# Patient Record
Sex: FEMALE | Race: WHITE | Employment: OTHER | URBAN - METROPOLITAN AREA
[De-identification: names, ages, dates, MRNs, and addresses within clinical notes are randomized per-mention and may not be internally consistent; named-entity substitution may affect disease eponyms.]

---

## 2018-05-10 ENCOUNTER — MEDICAL CORRESPONDENCE (OUTPATIENT)
Dept: HEALTH INFORMATION MANAGEMENT | Facility: CLINIC | Age: 68
End: 2018-05-10

## 2018-05-10 ENCOUNTER — TRANSFERRED RECORDS (OUTPATIENT)
Dept: HEALTH INFORMATION MANAGEMENT | Facility: CLINIC | Age: 68
End: 2018-05-10

## 2018-06-08 ENCOUNTER — APPOINTMENT (OUTPATIENT)
Dept: INTERVENTIONAL RADIOLOGY/VASCULAR | Facility: CLINIC | Age: 68
End: 2018-06-08
Attending: INTERNAL MEDICINE
Payer: MEDICARE

## 2018-06-08 ENCOUNTER — HOSPITAL ENCOUNTER (OUTPATIENT)
Facility: CLINIC | Age: 68
Discharge: HOME | End: 2018-06-08
Attending: RADIOLOGY | Admitting: RADIOLOGY
Payer: MEDICARE

## 2018-06-08 VITALS
DIASTOLIC BLOOD PRESSURE: 89 MMHG | HEIGHT: 66 IN | BODY MASS INDEX: 32.14 KG/M2 | TEMPERATURE: 96.7 F | RESPIRATION RATE: 16 BRPM | WEIGHT: 200 LBS | OXYGEN SATURATION: 98 % | SYSTOLIC BLOOD PRESSURE: 163 MMHG

## 2018-06-08 DIAGNOSIS — I82.401 DEEP VEIN THROMBOSIS (DVT) OF RIGHT LOWER EXTREMITY, UNSPECIFIED CHRONICITY, UNSPECIFIED VEIN (H): ICD-10-CM

## 2018-06-08 PROCEDURE — C1769 GUIDE WIRE: HCPCS

## 2018-06-08 PROCEDURE — 99152 MOD SED SAME PHYS/QHP 5/>YRS: CPT

## 2018-06-08 PROCEDURE — 25000128 H RX IP 250 OP 636: Performed by: RADIOLOGY

## 2018-06-08 PROCEDURE — 27210742 ZZH CATH CR1

## 2018-06-08 PROCEDURE — 37193 REM ENDOVAS VENA CAVA FILTER: CPT

## 2018-06-08 PROCEDURE — 76937 US GUIDE VASCULAR ACCESS: CPT

## 2018-06-08 PROCEDURE — 27210908 ZZH NEEDLE CR4

## 2018-06-08 PROCEDURE — 25000125 ZZHC RX 250

## 2018-06-08 PROCEDURE — 27210906 ZZH KIT CR8

## 2018-06-08 PROCEDURE — C1773 RET DEV, INSERTABLE: HCPCS

## 2018-06-08 RX ORDER — FENTANYL CITRATE 50 UG/ML
50 INJECTION, SOLUTION INTRAMUSCULAR; INTRAVENOUS
Status: DISCONTINUED | OUTPATIENT
Start: 2018-06-08 | End: 2018-06-08 | Stop reason: HOSPADM

## 2018-06-08 RX ORDER — NALOXONE HYDROCHLORIDE 0.4 MG/ML
.1-.4 INJECTION, SOLUTION INTRAMUSCULAR; INTRAVENOUS; SUBCUTANEOUS
Status: DISCONTINUED | OUTPATIENT
Start: 2018-06-08 | End: 2018-06-08 | Stop reason: HOSPADM

## 2018-06-08 RX ORDER — FENTANYL CITRATE 50 UG/ML
25 INJECTION, SOLUTION INTRAMUSCULAR; INTRAVENOUS EVERY 5 MIN PRN
Status: DISCONTINUED | OUTPATIENT
Start: 2018-06-08 | End: 2018-06-08 | Stop reason: HOSPADM

## 2018-06-08 RX ORDER — ACETAMINOPHEN 500 MG
500 TABLET ORAL EVERY 6 HOURS PRN
Status: CANCELLED | OUTPATIENT
Start: 2018-06-08

## 2018-06-08 RX ORDER — FLUMAZENIL 0.1 MG/ML
0.2 INJECTION, SOLUTION INTRAVENOUS
Status: DISCONTINUED | OUTPATIENT
Start: 2018-06-08 | End: 2018-06-08 | Stop reason: HOSPADM

## 2018-06-08 RX ORDER — NICOTINE POLACRILEX 4 MG
15-30 LOZENGE BUCCAL
Status: CANCELLED | OUTPATIENT
Start: 2018-06-08

## 2018-06-08 RX ORDER — DEXTROSE MONOHYDRATE 25 G/50ML
25-50 INJECTION, SOLUTION INTRAVENOUS
Status: CANCELLED | OUTPATIENT
Start: 2018-06-08

## 2018-06-08 RX ORDER — FENTANYL CITRATE 50 UG/ML
INJECTION, SOLUTION INTRAMUSCULAR; INTRAVENOUS
Status: DISCONTINUED
Start: 2018-06-08 | End: 2018-06-08 | Stop reason: HOSPADM

## 2018-06-08 RX ORDER — LIDOCAINE HYDROCHLORIDE 10 MG/ML
INJECTION, SOLUTION EPIDURAL; INFILTRATION; INTRACAUDAL; PERINEURAL
Status: COMPLETED
Start: 2018-06-08 | End: 2018-06-08

## 2018-06-08 RX ADMIN — HEPARIN SODIUM 10000 UNITS: 10000 INJECTION, SOLUTION INTRAVENOUS; SUBCUTANEOUS at 08:41

## 2018-06-08 RX ADMIN — MIDAZOLAM 1 MG: 1 INJECTION INTRAMUSCULAR; INTRAVENOUS at 08:42

## 2018-06-08 RX ADMIN — FENTANYL CITRATE 50 MCG: 50 INJECTION INTRAMUSCULAR; INTRAVENOUS at 08:33

## 2018-06-08 RX ADMIN — MIDAZOLAM 1 MG: 1 INJECTION INTRAMUSCULAR; INTRAVENOUS at 08:34

## 2018-06-08 RX ADMIN — LIDOCAINE HYDROCHLORIDE 6 ML: 10 INJECTION, SOLUTION EPIDURAL; INFILTRATION; INTRACAUDAL; PERINEURAL at 08:40

## 2018-06-08 NOTE — DISCHARGE INSTRUCTIONS
Invasive Radiology Procedures Discharge Instructions      The doctor who did your procedure today was Dr. Nickerson     Diet and medicines      Go back to your normal diet.    You may start taking your normal medicines again (including Coumadin, or warfarin),         as shown on your medicine sheet.     If you take aspirin, Plavix or other anti-platelet drugs: Start taking it tomorrow.     If take Coumadin (warfarin): Ask your doctor when to have your INR checked        For minor pain, you may take Tylenol (acetaminophen) or Advil (ibuprofen).    Activity and puncture site    You may go back to normal activity in 24 hours. Wait 48 hours before lifting,straining, exercise or other strenuous activity.    For the next day or two, check your puncture site often while you are awake.    Change the Band-Aid or bandage tomorrow.    You may shower tomorrow morning. No bathing or swimming until yourpuncture site has fully healed.     If you received IV medicine to sedate you: Versed and fentanyl  You may feel drowsy, forgetful or unsteady. For the next 24 hours, do not drive,  drink alcohol or make any important decisions.    ??Know when to call for help  Call your doctor if you have:  -?A fever greater over 101 F (38.3 C), taken under the tongue.  -?A lot of bleeding or swelling at your puncture site.  -?Pain that is getting worse.  -?Shortness of breath.  Call 911 or go the emergency room if you have:  -?Severe chest pain or trouble breathing.  -?A tube that falls out.  -?Increased blood in your sputum (phlegm).  -?Bleeding that you cannot control.  Important phone numbers:  ??[facility] ..................................................................... [phone]

## 2018-06-08 NOTE — IP AVS SNAPSHOT
MRN:[id number]                      After Visit Summary   6/8/2018    Jannie Tejada    MRN: [id number]           Visit Information        Department      6/8/2018  7:33 AM [facility]          Review of your medicines      UNREVIEWED medicines. Ask your doctor about these medicines        Dose / Directions    fluorescein-benoxinate ophthalmic solution   Commonly known as:  FLURATE        Dose:  1 drop   1 drop once   Refills:  0       hydrochlorothiazide 12.5 MG capsule   Commonly known as:  MICROZIDE        Take by mouth daily   Refills:  0       LIPITOR PO        Dose:  10 mg   Take 10 mg by mouth daily   Refills:  0       LISINOPRIL PO        Dose:  10 mg   Take 10 mg by mouth daily   Refills:  0       magic mouthwash suspension (diphenhydrAMINE, lidocaine, aluminum-magnesium & simethicone) Susp compounding kit        Dose:  15 mL   Swish and swallow 15 mLs in mouth every 6 hours as needed (heartburn, upper abdominal pain)   Quantity:  237 mL   Refills:  0       metoclopramide 10 MG tablet   Commonly known as:  REGLAN        Dose:  10 mg   Take 1 tablet (10 mg) by mouth 4 times daily as needed (nausea or before bed)   Quantity:  20 tablet   Refills:  0       PROZAC PO        Dose:  10 mg   Take 10 mg by mouth   Refills:  0                Protect others around you: Learn how to safely use, store and throw away your medicines at www.disposemymeds.org.         Follow-ups after your visit        Your next 10 appointments already scheduled     Jul 12, 2018   Procedure with Kimberlee Dial MD   [facility] ([facility])    [address] [place] [address]   [phone]           [facility] is located at [address] [place]               Care Instructions        Further instructions from your care team         Invasive Radiology Procedures Discharge Instructions      The doctor who did your procedure today was   "Krishan     Diet and medicines      Go back to your normal diet.    You may start taking your normal medicines again (including Coumadin, or warfarin),         as shown on your medicine sheet.     If you take aspirin, Plavix or other anti-platelet drugs: Start taking it tomorrow.     If take Coumadin (warfarin): Ask your doctor when to have your INR checked        For minor pain, you may take Tylenol (acetaminophen) or Advil (ibuprofen).    Activity and puncture site    You may go back to normal activity in 24 hours. Wait 48 hours before lifting,straining, exercise or other strenuous activity.    For the next day or two, check your puncture site often while you are awake.    Change the Band-Aid or bandage tomorrow.    You may shower tomorrow morning. No bathing or swimming until yourpuncture site has fully healed.     If you received IV medicine to sedate you: Versed and fentanyl  You may feel drowsy, forgetful or unsteady. For the next 24 hours, do not drive,  drink alcohol or make any important decisions.    ??Know when to call for help  Call your doctor if you have:  -?A fever greater over 101 F (38.3 C), taken under the tongue.  -?A lot of bleeding or swelling at your puncture site.  -?Pain that is getting worse.  -?Shortness of breath.  Call 911 or go the emergency room if you have:  -?Severe chest pain or trouble breathing.  -?A tube that falls out.  -?Increased blood in your sputum (phlegm).  -?Bleeding that you cannot control.  Important phone numbers:  ??[facility] ..................................................................... [phone]       Additional Information About Your Visit        [facility] Information     [facility] lets you send messages to your doctor, view your test results, renew your prescriptions, schedule appointments and more. To sign up, go to [email or web address] . Click on \"Log in\" on the left side of the screen, which will take you to the Welcome page. Then click " "on \"Sign up Now\" on the right side of the page.     You will be asked to enter the access code listed below, as well as some personal information. Please follow the directions to create your username and password.     Your access code is: [email or web address]  Expires: 2018  9:27 AM     Your access code will  in 90 days. If you need help or a new code, please call your [place] clinic or [phone].        Care EveryWhere ID     This is your Care EveryWhere ID. This could be used by other organizations to access your [place] medical records  [id number]        Your Vitals Were     Blood Pressure Temperature Respirations Height Weight Pulse Oximetry    164/92 (BP Location: Left arm) 96.7  F (35.9  C) (Temporal) 16 1.676 m (5' 6\") 90.7 kg (200 lb) 95%    BMI (Body Mass Index)                   32.28 kg/m2            Primary Care Provider Office Phone # Fax #    Yoon Mcgee [phone]      Equal Access to Services     [facility]: Hadii saurabh ku hadasho Sodickson, waaxda luqadaha, qaybta kaalmada adeegyajoaquin, gurinder amanda . So [facility] [phone].    ATENCIÓN: Si habla español, tiene a bunn disposición servicios gratuitos de asistencia lingüística. Llame al [phone].    We comply with applicable federal civil rights laws and [place] laws. We do not discriminate on the basis of race, color, national origin, age, disability, sex, sexual orientation, or gender identity.            Thank you!     Thank you for choosing [facility] for your care. Our goal is always to provide you with excellent care. Hearing back from our patients is one way we can continue to improve our services. Please take a few minutes to complete the written survey that you may receive in the mail after you visit. If you would like to speak to someone directly about your visit please contact Patient Relations at [phone]. Thank you!               Medication List: This is a list of " all your medications and when to take them. Check marks below indicate your daily home schedule. Keep this list as a reference.      Medications           Morning Afternoon Evening Bedtime As Needed    fluorescein-benoxinate ophthalmic solution   Commonly known as:  FLURATE   1 drop once                                hydrochlorothiazide 12.5 MG capsule   Commonly known as:  MICROZIDE   Take by mouth daily                                LIPITOR PO   Take 10 mg by mouth daily                                LISINOPRIL PO   Take 10 mg by mouth daily                                magic mouthwash suspension (diphenhydrAMINE, lidocaine, aluminum-magnesium & simethicone) Susp compounding kit   Swish and swallow 15 mLs in mouth every 6 hours as needed (heartburn, upper abdominal pain)                                metoclopramide 10 MG tablet   Commonly known as:  REGLAN   Take 1 tablet (10 mg) by mouth 4 times daily as needed (nausea or before bed)                                PROZAC PO   Take 10 mg by mouth

## 2018-06-08 NOTE — PROCEDURES
RADIOLOGY POST PROCEDURE NOTE w/ SEDATION  Patient name: Jannie Tejada  MRN: [id number]  : 1950    Pre-procedure diagnosis: IVC filter no longer needed.   Post-procedure diagnosis: Same    Procedure Date/Time: 2018  9:04 AM  Procedure: IVC filter removal.   Estimated blood loss: None  Specimen(s) collected with description: none    I determined this patient to be an appropriate candidate for the planned sedation and procedure and reassessed the patient IMMEDIATELY PRIOR to sedation and procedure.     The patient tolerated the procedure well with no immediate complications.  Significant findings:none    See imaging dictation for procedural details.    Provider name: Lara Nickerson  Assistant(s):None

## 2018-06-08 NOTE — IP AVS SNAPSHOT
[facility]    [address]    [facility] [address]    Phone:  [phone]                                       After Visit Summary   6/8/2018    Jannie Tejada    MRN: [id number]           After Visit Summary Signature Page     I have received my discharge instructions, and my questions have been answered. I have discussed any challenges I see with this plan with the nurse or doctor.    ..........................................................................................................................................  Patient/Patient Representative Signature      ..........................................................................................................................................  Patient Representative Print Name and Relationship to Patient    ..................................................               ................................................  Date                                            Time    ..........................................................................................................................................  Reviewed by Signature/Title    ...................................................              ..............................................  Date                                                            Time

## 2018-07-10 ENCOUNTER — HOSPITAL ENCOUNTER (OUTPATIENT)
Facility: CLINIC | Age: 68
Discharge: HOME | End: 2018-07-10
Attending: COLON & RECTAL SURGERY | Admitting: COLON & RECTAL SURGERY
Payer: MEDICARE

## 2018-07-10 VITALS
WEIGHT: 200 LBS | RESPIRATION RATE: 14 BRPM | BODY MASS INDEX: 31.39 KG/M2 | DIASTOLIC BLOOD PRESSURE: 75 MMHG | SYSTOLIC BLOOD PRESSURE: 115 MMHG | HEIGHT: 67 IN | OXYGEN SATURATION: 95 %

## 2018-07-10 LAB — COLONOSCOPY: NORMAL

## 2018-07-10 PROCEDURE — 45385 COLONOSCOPY W/LESION REMOVAL: CPT | Performed by: COLON & RECTAL SURGERY

## 2018-07-10 PROCEDURE — G0500 MOD SEDAT ENDO SERVICE >5YRS: HCPCS | Performed by: COLON & RECTAL SURGERY

## 2018-07-10 PROCEDURE — 99153 MOD SED SAME PHYS/QHP EA: CPT

## 2018-07-10 PROCEDURE — 25000128 H RX IP 250 OP 636: Performed by: COLON & RECTAL SURGERY

## 2018-07-10 PROCEDURE — 45380 COLONOSCOPY AND BIOPSY: CPT | Performed by: COLON & RECTAL SURGERY

## 2018-07-10 PROCEDURE — 88305 TISSUE EXAM BY PATHOLOGIST: CPT | Mod: 26 | Performed by: COLON & RECTAL SURGERY

## 2018-07-10 PROCEDURE — 88305 TISSUE EXAM BY PATHOLOGIST: CPT | Performed by: COLON & RECTAL SURGERY

## 2018-07-10 RX ORDER — ONDANSETRON 2 MG/ML
4 INJECTION INTRAMUSCULAR; INTRAVENOUS EVERY 6 HOURS PRN
Status: DISCONTINUED | OUTPATIENT
Start: 2018-07-10 | End: 2018-07-10 | Stop reason: HOSPADM

## 2018-07-10 RX ORDER — LIDOCAINE 40 MG/G
CREAM TOPICAL
Status: DISCONTINUED | OUTPATIENT
Start: 2018-07-10 | End: 2018-07-10 | Stop reason: HOSPADM

## 2018-07-10 RX ORDER — ONDANSETRON 2 MG/ML
4 INJECTION INTRAMUSCULAR; INTRAVENOUS
Status: DISCONTINUED | OUTPATIENT
Start: 2018-07-10 | End: 2018-07-10 | Stop reason: HOSPADM

## 2018-07-10 RX ORDER — FENTANYL CITRATE 50 UG/ML
INJECTION, SOLUTION INTRAMUSCULAR; INTRAVENOUS PRN
Status: DISCONTINUED | OUTPATIENT
Start: 2018-07-10 | End: 2018-07-10 | Stop reason: HOSPADM

## 2018-07-10 RX ORDER — FLUMAZENIL 0.1 MG/ML
0.2 INJECTION, SOLUTION INTRAVENOUS
Status: DISCONTINUED | OUTPATIENT
Start: 2018-07-10 | End: 2018-07-10 | Stop reason: HOSPADM

## 2018-07-10 RX ORDER — ONDANSETRON 4 MG/1
4 TABLET, ORALLY DISINTEGRATING ORAL EVERY 6 HOURS PRN
Status: DISCONTINUED | OUTPATIENT
Start: 2018-07-10 | End: 2018-07-10 | Stop reason: HOSPADM

## 2018-07-10 RX ORDER — NALOXONE HYDROCHLORIDE 0.4 MG/ML
.1-.4 INJECTION, SOLUTION INTRAMUSCULAR; INTRAVENOUS; SUBCUTANEOUS
Status: DISCONTINUED | OUTPATIENT
Start: 2018-07-10 | End: 2018-07-10 | Stop reason: HOSPADM

## 2018-07-10 NOTE — OP NOTE
See Provation Note In Chart    Kimberlee Dial MD  [facility]  Office Phone # [phone]

## 2018-07-10 NOTE — IP AVS SNAPSHOT
MRN:[id number]                      After Visit Summary   7/10/2018    Jannie Tejada    MRN: [id number]           Thank you!     Thank you for choosing [facility] for your care. Our goal is always to provide you with excellent care. Hearing back from our patients is one way we can continue to improve our services. Please take a few minutes to complete the written survey that you may receive in the mail after you visit. If you would like to speak to someone directly about your visit please contact Patient Relations at [phone]. Thank you!          Patient Information     Date Of Birth          1950        About your hospital stay     You were admitted on:  July 10, 2018 You last received care in the:  [facility]    You were discharged on:  July 10, 2018       Who to Call     For medical emergencies, please call 911.  For non-urgent questions about your medical care, please call your primary care provider or clinic, [phone]  For questions related to your surgery, please call your surgery clinic        Attending Provider     Provider Specialty    Kimberlee Dial MD [facility]       Primary Care Provider Office Phone # Fax #    Yoon Mcgee [phone]      Further instructions from your care team         Understanding Colon and Rectal Polyps     The colon has a smooth lining composed of millions of cells.     The colon (also called the large intestine) is a muscular tube that forms the last part of the digestive tract. It absorbs water and stores food waste. The colon is about 4 to 6 feet long. The rectum is the last 6 inches of the colon. The colon and rectum have a smooth lining composed of millions of cells. Changes in these cells can lead to growths in the colon that can become cancerous and should be removed.     When the Colon Lining Changes  Changes that occur in the cells that line the colon or rectum can lead to  "growths called polyps. Over a period of years, polyps can turn cancerous. Removing polyps early may prevent cancer from ever forming.      Polyps  Polyps are fleshy clumps of tissue that form on the lining of the colon or rectum. Small polyps are usually benign (not cancerous). However, over time, cells in a polyp can change and become cancerous. The larger a polyp grows, the more likely this is to happen. Also, certain types of polyps known as adenomatous polyps are considered premalignant. This means that they will almost always become cancerous if they re not removed.          Cancer  Almost all colorectal cancers start when polyp cells begin growing abnormally. As a cancerous tumor grows, it may involve more and more of the colon or rectum. In time, cancer can also grow beyond the colon or rectum and spread to nearby organs or to glands called lymph nodes. The cells can also travel to other parts of the body. This is known as metastasis. The earlier a cancerous tumor is removed, the better the chance of preventing its spread.        6009-0469 [address], [place] [address]. All rights reserved. This information is not intended as a substitute for professional medical care. Always follow your healthcare professional's instructions.    Pending Results     No orders found from 7/8/2018 to 7/11/2018.            Admission Information     Date & Time Provider Department Dept. Phone    7/10/2018 Kimberlee Dial MD [facility] [phone]      Your Vitals Were     Blood Pressure Respirations Height Weight Pulse Oximetry BMI (Body Mass Index)    110/71 10 1.702 m (5' 7\") 90.7 kg (200 lb) 98% 31.32 kg/m2      [facility] Information     [facility] lets you send messages to your doctor, view your test results, renew your prescriptions, schedule appointments and more. To sign up, go to [email or web address] . Click on \"Log in\" on the left side of the screen, which will take you to " "the Welcome page. Then click on \"Sign up Now\" on the right side of the page.     You will be asked to enter the access code listed below, as well as some personal information. Please follow the directions to create your username and password.     Your access code is: [email or web address]  Expires: 2018  9:27 AM     Your access code will  in 90 days. If you need help or a new code, please call your [place] clinic or [phone].        Care EveryWhere ID     This is your Care EveryWhere ID. This could be used by other organizations to access your [place] medical records  [id number]        Equal Access to Services     [facility] : Timothy Sorensen, lyric pimentel, savage gregorio, gurinder amanda . So [facility] [phone].    ATENCIÓN: Si habla español, tiene a bunn disposición servicios gratuitos de asistencia lingüística. Llame al [phone].    We comply with applicable federal civil rights laws and [place] laws. We do not discriminate on the basis of race, color, national origin, age, disability, sex, sexual orientation, or gender identity.               Review of your medicines      CONTINUE these medicines which have NOT CHANGED        Dose / Directions    fluorescein-benoxinate ophthalmic solution   Commonly known as:  FLURATE        Dose:  1 drop   1 drop once   Refills:  0       hydrochlorothiazide 12.5 MG capsule   Commonly known as:  MICROZIDE        Take by mouth daily   Refills:  0       LIPITOR PO        Dose:  10 mg   Take 10 mg by mouth daily   Refills:  0       LISINOPRIL PO        Dose:  10 mg   Take 10 mg by mouth daily   Refills:  0       magic mouthwash suspension (diphenhydrAMINE, lidocaine, aluminum-magnesium & simethicone) Susp compounding kit        Dose:  15 mL   Swish and swallow 15 mLs in mouth every 6 hours as needed (heartburn, upper abdominal pain)   Quantity:  237 mL   Refills:  0       metoclopramide 10 MG tablet   Commonly known " as:  REGLAN        Dose:  10 mg   Take 1 tablet (10 mg) by mouth 4 times daily as needed (nausea or before bed)   Quantity:  20 tablet   Refills:  0       PROZAC PO        Dose:  10 mg   Take 10 mg by mouth   Refills:  0                Protect others around you: Learn how to safely use, store and throw away your medicines at www.disposemymeds.org.             Medication List: This is a list of all your medications and when to take them. Check marks below indicate your daily home schedule. Keep this list as a reference.      Medications           Morning Afternoon Evening Bedtime As Needed    fluorescein-benoxinate ophthalmic solution   Commonly known as:  FLURATE   1 drop once                                hydrochlorothiazide 12.5 MG capsule   Commonly known as:  MICROZIDE   Take by mouth daily                                LIPITOR PO   Take 10 mg by mouth daily                                LISINOPRIL PO   Take 10 mg by mouth daily                                magic mouthwash suspension (diphenhydrAMINE, lidocaine, aluminum-magnesium & simethicone) Susp compounding kit   Swish and swallow 15 mLs in mouth every 6 hours as needed (heartburn, upper abdominal pain)                                metoclopramide 10 MG tablet   Commonly known as:  REGLAN   Take 1 tablet (10 mg) by mouth 4 times daily as needed (nausea or before bed)                                PROZAC PO   Take 10 mg by mouth

## 2018-07-10 NOTE — DISCHARGE INSTRUCTIONS
Understanding Colon and Rectal Polyps     The colon has a smooth lining composed of millions of cells.     The colon (also called the large intestine) is a muscular tube that forms the last part of the digestive tract. It absorbs water and stores food waste. The colon is about 4 to 6 feet long. The rectum is the last 6 inches of the colon. The colon and rectum have a smooth lining composed of millions of cells. Changes in these cells can lead to growths in the colon that can become cancerous and should be removed.     When the Colon Lining Changes  Changes that occur in the cells that line the colon or rectum can lead to growths called polyps. Over a period of years, polyps can turn cancerous. Removing polyps early may prevent cancer from ever forming.      Polyps  Polyps are fleshy clumps of tissue that form on the lining of the colon or rectum. Small polyps are usually benign (not cancerous). However, over time, cells in a polyp can change and become cancerous. The larger a polyp grows, the more likely this is to happen. Also, certain types of polyps known as adenomatous polyps are considered premalignant. This means that they will almost always become cancerous if they re not removed.          Cancer  Almost all colorectal cancers start when polyp cells begin growing abnormally. As a cancerous tumor grows, it may involve more and more of the colon or rectum. In time, cancer can also grow beyond the colon or rectum and spread to nearby organs or to glands called lymph nodes. The cells can also travel to other parts of the body. This is known as metastasis. The earlier a cancerous tumor is removed, the better the chance of preventing its spread.        1898-0659 [facility], [address] [place] [address], [place] [address]. All rights reserved. This information is not intended as a substitute for professional medical care. Always follow your healthcare professional's instructions.

## 2018-07-10 NOTE — H&P
Pre-Endoscopy History and Physical     Jannie Tejada MRN# [id number]   YOB: 1950 Age: 68 year old     Date of Procedure: 7/10/2018  Primary care provider: Yoon Mcgee  Type of Endoscopy: colonoscopy  Reason for Procedure: screening  Type of Anesthesia Anticipated: Moderate Sedation    HPI:    Jannie is a 68 year old female who will be undergoing the above procedure.      A history and physical has been performed. The patient's medications and allergies have been reviewed. The risks and benefits of the procedure and the sedation options and risks were discussed with the patient.  All questions were answered and informed consent was obtained.      She denies a personal or family history of anesthesia complications or bleeding disorders.     No Known Allergies     Prior to Admission Medications   Prescriptions Last Dose Informant Patient Reported? Taking?   Atorvastatin Calcium (LIPITOR PO) 2018 at Unknown time  Yes Yes   Sig: Take 10 mg by mouth daily   DPH-Lido-AlHydr-MgHydr-Simeth (FIRST-MOUTHWASH BLM) SUSP Unknown at Unknown time  No No   Sig: Swish and swallow 15 mLs in mouth every 6 hours as needed (heartburn, upper abdominal pain)   FLUoxetine HCl (PROZAC PO) 2018 at Unknown time  Yes Yes   Sig: Take 10 mg by mouth   LISINOPRIL PO 2018 at Unknown time  Yes Yes   Sig: Take 10 mg by mouth daily   fluorescein-benoxinate (FLURATE) ophthalmic solution 2018 at Unknown time  Yes Yes   Si drop once   hydrochlorothiazide (MICROZIDE) 12.5 MG capsule 2018 at Unknown time  Yes Yes   Sig: Take by mouth daily   metoclopramide (REGLAN) 10 MG tablet Unknown at Unknown time  No No   Sig: Take 1 tablet (10 mg) by mouth 4 times daily as needed (nausea or before bed)      Facility-Administered Medications: None       There is no problem list on file for this patient.       Past Medical History:   Diagnosis Date     Depressive disorder      Hypertension         Past Surgical History:  "  Procedure Laterality Date     BREAST SURGERY       COLONOSCOPY  2013    [place] [facility]     COLONOSCOPY  07/10/2018    DR. Dial [facility]     EYE SURGERY  07/02/2018    cataract surgery right eye     EYE SURGERY  07/16/2018    cataract surgery left eye     GI SURGERY       VASCULAR SURGERY         Social History   Substance Use Topics     Smoking status: Former Smoker     Smokeless tobacco: Never Used     Alcohol use Not on file       Family History   Problem Relation Age of Onset     Colon Cancer Paternal Grandmother        REVIEW OF SYSTEMS:     5 point ROS negative except as noted above in HPI, including Gen., Resp., CV, GI &  system review.      PHYSICAL EXAM:   Ht 1.702 m (5' 7\")  Wt 90.7 kg (200 lb)  BMI 31.32 kg/m2 Estimated body mass index is 31.32 kg/(m^2) as calculated from the following:    Height as of this encounter: 1.702 m (5' 7\").    Weight as of this encounter: 90.7 kg (200 lb).   GENERAL APPEARANCE: healthy and alert  MENTAL STATUS: alert  AIRWAY EXAM: Mallampatti Class II (visualization of the soft palate, fauces, and uvula)  RESP: lungs clear to auscultation - no rales, rhonchi or wheezes  CV: regular rates and rhythm      DIAGNOSTICS:    Not indicated      IMPRESSION   ASA Class 2 - Mild systemic disease        PLAN:       Plan for colonoscopy. We discussed the risks, benefits and alternatives and the patient wished to proceed.    The above has been forwarded to the consulting provider.      Signed Electronically by: Kimberlee Dial MD  July 10, 2018    "

## 2018-07-11 LAB — COPATH REPORT: NORMAL

## 2020-07-28 ENCOUNTER — HOSPITAL ENCOUNTER (OUTPATIENT)
Dept: GENERAL RADIOLOGY | Facility: CLINIC | Age: 70
End: 2020-07-28
Attending: INTERNAL MEDICINE
Payer: MEDICARE

## 2020-07-28 ENCOUNTER — HOSPITAL ENCOUNTER (OUTPATIENT)
Dept: SPEECH THERAPY | Facility: CLINIC | Age: 70
End: 2020-07-28
Attending: INTERNAL MEDICINE
Payer: MEDICARE

## 2020-07-28 ENCOUNTER — HOSPITAL ENCOUNTER (OUTPATIENT)
Dept: GENERAL RADIOLOGY | Facility: CLINIC | Age: 70
Discharge: HOME | End: 2020-07-28
Attending: INTERNAL MEDICINE | Admitting: INTERNAL MEDICINE
Payer: MEDICARE

## 2020-07-28 DIAGNOSIS — R13.19 ESOPHAGEAL DYSPHAGIA: ICD-10-CM

## 2020-07-28 DIAGNOSIS — K21.9 GASTROESOPHAGEAL REFLUX DISEASE, ESOPHAGITIS PRESENCE NOT SPECIFIED: ICD-10-CM

## 2020-07-28 PROCEDURE — 74230 X-RAY XM SWLNG FUNCJ C+: CPT

## 2020-07-28 PROCEDURE — 92611 MOTION FLUOROSCOPY/SWALLOW: CPT | Mod: GN | Performed by: SPEECH-LANGUAGE PATHOLOGIST

## 2020-07-28 PROCEDURE — 74220 X-RAY XM ESOPHAGUS 1CNTRST: CPT

## 2020-07-28 NOTE — PROGRESS NOTES
Outpatient Video Swallow Study     07/28/20 1035   General Information   Type Of Visit Initial   Start Of Care Date 07/28/20   Referring Physician Dr. Iain Cortes   Orders Evaluate And Treat   Medical Diagnosis Gastroesophageal reflux disease, esophagitis presence not specified K21.9; Esophageal dysphagia R13.10    Onset Of Illness/injury Or Date Of Surgery 06/26/20  (order date)   Pertinent History of Current Problem/OT: Additional Occupational Profile Info This 70 year old female was referred for a Video Swallow Study from GI for esophageal dysphagia. Pt denied dysphagia or aspiration symptoms. Pt reported long standing hx of reflux and primary concerns with constipation. Noted signficant reflux symptoms in MD note from 2015. Hx of lap band surgery. GI note not available at this time.    Respiratory Status Room air   Patient/family Goals Pt unsure of the reason for referral   VFSS Eval: Thin Liquid Texture Trial   Mode of Presentation, Thin Liquid cup;straw;self-fed   Preparatory Phase WFL   Oral Phase, Thin Liquid WFL   Pharyngeal Phase, Thin Liquid WFL   Rosenbek's Penetration Aspiration Scale: Thin Liquid Trial Results 1 - no aspiration, contrast does not enter airway   Diagnostic Statement [facility]   VFSS Eval: Puree Solid Texture Trial   Mode of Presentation, Puree spoon;self-fed   Preparatory Phase WFL   Oral Phase, Puree WFL   Pharyngeal Phase, Puree WFL   Rosenbek's Penetration Aspiration Scale: Puree Food Trial Results 1 - no aspiration, contrast does not enter airway   Diagnostic Statement [facility]   VFSS Eval: Solid Food Texture Trial   Mode of Presentation, Solid self-fed   Preparatory Phase WFL   Oral Phase, Solid WFL   Pharyngeal Phase, Solid WFL   Rosenbek's Penetration Aspiration Scale: Solid Food Trial Results 1 - no aspiration, contrast does not enter airway   Diagnostic Statement [facility]   Esophageal Phase of Swallow   Patient reports or presents with symptoms of esophageal dysphagia Yes   Esophageal sweep  performed during today s vidofluoroscopic exam  Please refer to radiologist's report for details   Esophageal comments No residue noted in the upper esophagus during VFSS   Swallow Eval: Clinical Impressions   Skilled Criteria for Therapy Intervention No problems identified which require skilled intervention   Functional Assessment Scale (FAS) 7   Dysphagia Outcome Severity Scale (ANDREW) Level 7 - ANDREW   Treatment Diagnosis Functional oral and pharyngeal phase   Recommended Feeding/Eating Techniques   (GERD precautions)   Demonstrates Need for Referral to Another Service   (GI)   Risks and Benefits of Treatment have been explained. Yes   Patient, family and/or staff in agreement with Plan of Care Yes   Clinical Impression Comments SLP: Pt presents with a functional oral and pharyngeal phase of swallow on Video Swallow Study. Thin liquids, puree solids and regular solid trialed with good timing and oral control, adequate epiglottic inversion and pharyngeal constriction. No penetration, aspiration, or pharyngeal residue throughout the study. Please refer to Radiology report for Esophagram. No SLP services indicated at this time.    Total Session Time   SLP Eval: VideoFluoroscopic Swallow function Minutes ([id number]) 15   Total Evaluation Time 15

## 2021-06-16 NOTE — PROGRESS NOTES
Patient A/O x4, tolerated PO intake, and able to ambulate. Discharge instructions reviewed with patient and spouse. Patient ambulated to car with this writer's assistance.  
left subclavicular/left
mid sternal/left

## (undated) DEVICE — KIT ENDO TURNOVER/PROCEDURE W/CLEAN A SCOPE LINERS 103888

## (undated) DEVICE — ENDO SNARE POLYPECTOMY OVAL 15MM LOOP SD-240U-15

## (undated) RX ORDER — FENTANYL CITRATE 50 UG/ML
INJECTION, SOLUTION INTRAMUSCULAR; INTRAVENOUS
Status: DISPENSED
Start: 2018-07-10